# Patient Record
Sex: MALE | Race: ASIAN | NOT HISPANIC OR LATINO
[De-identification: names, ages, dates, MRNs, and addresses within clinical notes are randomized per-mention and may not be internally consistent; named-entity substitution may affect disease eponyms.]

---

## 2019-06-03 ENCOUNTER — APPOINTMENT (OUTPATIENT)
Dept: CARDIOLOGY | Facility: CLINIC | Age: 69
End: 2019-06-03
Payer: COMMERCIAL

## 2019-06-03 ENCOUNTER — NON-APPOINTMENT (OUTPATIENT)
Age: 69
End: 2019-06-03

## 2019-06-03 VITALS
HEART RATE: 60 BPM | HEIGHT: 67 IN | WEIGHT: 184 LBS | SYSTOLIC BLOOD PRESSURE: 175 MMHG | DIASTOLIC BLOOD PRESSURE: 74 MMHG | BODY MASS INDEX: 28.88 KG/M2 | OXYGEN SATURATION: 99 %

## 2019-06-03 PROCEDURE — 93000 ELECTROCARDIOGRAM COMPLETE: CPT

## 2019-06-03 PROCEDURE — 99204 OFFICE O/P NEW MOD 45 MIN: CPT

## 2019-06-03 NOTE — REVIEW OF SYSTEMS
[see HPI] : see HPI [FreeTextEntry1] : missed beats, irregular heart beats [Negative] : Constitutional

## 2019-06-03 NOTE — ASSESSMENT
[FreeTextEntry1] : 68 year old [place] male with PMH HTN, BPH who comes for an initial cardiac evaluation, self referred, secondary to symptoms of "missed beats" . Pt;s primary is Dr. Brandi Singh.\par Appears euvolemic and hypertensive

## 2019-06-03 NOTE — HISTORY OF PRESENT ILLNESS
[FreeTextEntry1] : Leny Tam is a 68 year old [place] male with PMH HTN, BPH who comes for an initial cardiac evaluation, self referred, secondary to symptoms of "missed beats" . Pt;s primary is Dr. Brandi Singh.\par \par Pt works full time in a clerical position. He states he does not have any limitations with walking and can walk many blocks and can climb several flights of stairs without dyspnea. He sleeps with one pillow with no orthopnea. He denies chest pain, palpitations, dizziness/LH and syncope. Of note, he smoked 2-3 cigarettes for 35 years and quit 10 years ago. His father is , age 75 secondary to MI and his mother age 30's after surgery. B/P in office today 175/74 but he did not take lisinopril 20mg yet today. He is unsure about any prior cardiac workup.

## 2019-06-03 NOTE — PHYSICAL EXAM
[Normal Appearance] : normal appearance [General Appearance - Well Developed] : well developed [Well Groomed] : well groomed [General Appearance - In No Acute Distress] : no acute distress [Eyelids - No Xanthelasma] : the eyelids demonstrated no xanthelasmas [Normal Conjunctiva] : the conjunctiva exhibited no abnormalities [Normal Oral Mucosa] : normal oral mucosa [No Oral Pallor] : no oral pallor [Normal Oropharynx] : normal oropharynx [Normal Jugular Venous A Waves Present] : normal jugular venous A waves present [Normal Jugular Venous V Waves Present] : normal jugular venous V waves present [No Jugular Venous Redd A Waves] : no jugular venous redd A waves [Heart Sounds] : normal S1 and S2 [Arterial Pulses Normal] : the arterial pulses were normal [Heart Rate And Rhythm] : heart rate and rhythm were normal [] : no respiratory distress [Edema] : no peripheral edema present [Bowel Sounds] : normal bowel sounds [Respiration, Rhythm And Depth] : normal respiratory rhythm and effort [Auscultation Breath Sounds / Voice Sounds] : lungs were clear to auscultation bilaterally [Abnormal Walk] : normal gait [Abdomen Soft] : soft [Abdomen Tenderness] : non-tender [Nail Clubbing] : no clubbing of the fingernails [Cyanosis, Localized] : no localized cyanosis [Petechial Hemorrhages (___cm)] : no petechial hemorrhages [FreeTextEntry1] : no edema [Skin Turgor] : normal skin turgor [Skin Color & Pigmentation] : normal skin color and pigmentation [Affect] : the affect was normal [Oriented To Time, Place, And Person] : oriented to person, place, and time

## 2019-06-03 NOTE — DISCUSSION/SUMMARY
[Patient] : the patient [___ Week(s)] : [unfilled] week(s) [FreeTextEntry1] : 1. Hypertensive heart disease\par - keep a log of B/P for 3 readings a day for one week and will bring in log to next appt\par - will obtain a blood pressure monitor\par - continue lisinopril 20 mg daily but take in the morning\par - schedule a TTE to eval LV function and assess valves\par - limit salt to less than 2 liters per day\par \par 2. Irregular/missed beats\par - will obtain a 48 hour holter monitor to R/O arrhythmia

## 2019-06-12 ENCOUNTER — OUTPATIENT (OUTPATIENT)
Dept: OUTPATIENT SERVICES | Facility: HOSPITAL | Age: 69
LOS: 1 days | End: 2019-06-12
Payer: COMMERCIAL

## 2019-06-12 DIAGNOSIS — R00.1 BRADYCARDIA, UNSPECIFIED: ICD-10-CM

## 2019-06-12 PROCEDURE — 93227 XTRNL ECG REC<48 HR R&I: CPT

## 2019-07-01 ENCOUNTER — APPOINTMENT (OUTPATIENT)
Dept: CARDIOLOGY | Facility: CLINIC | Age: 69
End: 2019-07-01
Payer: COMMERCIAL

## 2019-07-01 ENCOUNTER — APPOINTMENT (OUTPATIENT)
Dept: CV DIAGNOSITCS | Facility: HOSPITAL | Age: 69
End: 2019-07-01

## 2019-07-01 ENCOUNTER — OUTPATIENT (OUTPATIENT)
Dept: OUTPATIENT SERVICES | Facility: HOSPITAL | Age: 69
LOS: 1 days | End: 2019-07-01
Payer: COMMERCIAL

## 2019-07-01 VITALS
WEIGHT: 180 LBS | HEIGHT: 67 IN | RESPIRATION RATE: 14 BRPM | BODY MASS INDEX: 28.25 KG/M2 | DIASTOLIC BLOOD PRESSURE: 72 MMHG | OXYGEN SATURATION: 99 % | SYSTOLIC BLOOD PRESSURE: 188 MMHG | HEART RATE: 56 BPM

## 2019-07-01 DIAGNOSIS — I10 ESSENTIAL (PRIMARY) HYPERTENSION: ICD-10-CM

## 2019-07-01 DIAGNOSIS — Z86.79 PERSONAL HISTORY OF OTHER DISEASES OF THE CIRCULATORY SYSTEM: ICD-10-CM

## 2019-07-01 DIAGNOSIS — R94.39 ABNORMAL RESULT OF OTHER CARDIOVASCULAR FUNCTION STUDY: ICD-10-CM

## 2019-07-01 PROCEDURE — 93306 TTE W/DOPPLER COMPLETE: CPT | Mod: 26

## 2019-07-01 PROCEDURE — 93000 ELECTROCARDIOGRAM COMPLETE: CPT

## 2019-07-01 PROCEDURE — 99214 OFFICE O/P EST MOD 30 MIN: CPT

## 2019-07-01 RX ORDER — AMLODIPINE BESYLATE 2.5 MG/1
2.5 TABLET ORAL DAILY
Qty: 60 | Refills: 3 | Status: DISCONTINUED | COMMUNITY
Start: 2019-07-01 | End: 2019-07-01

## 2019-07-01 NOTE — ASSESSMENT
[FreeTextEntry1] : 68 year old [place] male with PMH HTN, BPH who comes for an initial cardiac evaluation, self referred, secondary to symptoms of "missed beats" . Pt;s primary is Dr. Brandi Singh.\par Appears euvolemic and hypertensive

## 2019-07-01 NOTE — HISTORY OF PRESENT ILLNESS
[FreeTextEntry1] : Leny Tam is a 68 year old [place] male with PMH HTN, BPH who came for an initial cardiac evaluation 6/3/19, self referred, secondary to symptoms of "missed beats" . Pt;s primary is Dr. Brandi Singh.\par \par Pt is here for a follow up appt . He had a 48 hour holter monitor 6/12-6/14/19 with NSR  bpm. rare isolated PAC's, PVC's, some patient triggered events that correlate with ectopic beats, no significant arrhythmia. 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 He states he does not have any limitations with walking and can walk many blocks and can climb several flights of stairs without dyspnea. He sleeps with one pillow with no orthopnea. He denies chest pain, palpitations, dizziness/LH and syncope.  B/P in office today 188/72 but his home B/P log has B/P readings in the range of 126//80, more above 130/80 than lower. He is going to follow up with lab work with his primary before he goes to [place] next week on 7/9/19 and will have results faxed to office. He had a TTE before the appt today and results are pending.

## 2019-07-01 NOTE — PHYSICAL EXAM
[General Appearance - Well Developed] : well developed [Normal Appearance] : normal appearance [Well Groomed] : well groomed [General Appearance - In No Acute Distress] : no acute distress [Normal Conjunctiva] : the conjunctiva exhibited no abnormalities [Eyelids - No Xanthelasma] : the eyelids demonstrated no xanthelasmas [Normal Oral Mucosa] : normal oral mucosa [No Oral Pallor] : no oral pallor [Normal Oropharynx] : normal oropharynx [Normal Jugular Venous A Waves Present] : normal jugular venous A waves present [Normal Jugular Venous V Waves Present] : normal jugular venous V waves present [No Jugular Venous Redd A Waves] : no jugular venous redd A waves [] : no respiratory distress [Respiration, Rhythm And Depth] : normal respiratory rhythm and effort [Auscultation Breath Sounds / Voice Sounds] : lungs were clear to auscultation bilaterally [Heart Rate And Rhythm] : heart rate and rhythm were normal [Heart Sounds] : normal S1 and S2 [Arterial Pulses Normal] : the arterial pulses were normal [Edema] : no peripheral edema present [Bowel Sounds] : normal bowel sounds [Abdomen Soft] : soft [Abdomen Tenderness] : non-tender [Abnormal Walk] : normal gait [Nail Clubbing] : no clubbing of the fingernails [Cyanosis, Localized] : no localized cyanosis [Petechial Hemorrhages (___cm)] : no petechial hemorrhages [Skin Color & Pigmentation] : normal skin color and pigmentation [Skin Turgor] : normal skin turgor [Oriented To Time, Place, And Person] : oriented to person, place, and time [Affect] : the affect was normal [FreeTextEntry1] : no edema

## 2019-07-01 NOTE — REVIEW OF SYSTEMS
[see HPI] : see HPI [Negative] : Constitutional [Recent Weight Loss (___ Lbs)] : recent [unfilled] ~Ulb weight loss [FreeTextEntry1] : missed beats, irregular heart beats

## 2019-07-01 NOTE — DISCUSSION/SUMMARY
[Patient] : the patient [___ Month(s)] : [unfilled] month(s) [FreeTextEntry3] : will have a B/P check and labs with primary next week [FreeTextEntry1] : 1. Hypertensive heart disease\par - continue to keep a log of B/P readings and will bring in log to next appt with goal < 130/80, call if consistently aabove 130/80\par - increase lisinopril to 30 mg from 20 mg daily \par - TTE done today to eval LV function and assess valves, will call with results\par - limit salt to less than 2 liters per day\par - add pro BNP to labs with primary, will fax resutls\par \par 2. Irregular/missed beats\par - 48 hour Holter monitor done 6/12-6/14/19 with no significant arrhythmias \par \par Follow up in 3 months

## 2019-10-07 ENCOUNTER — APPOINTMENT (OUTPATIENT)
Dept: CARDIOLOGY | Facility: CLINIC | Age: 69
End: 2019-10-07